# Patient Record
Sex: MALE | Race: WHITE | HISPANIC OR LATINO | Employment: FULL TIME | URBAN - METROPOLITAN AREA
[De-identification: names, ages, dates, MRNs, and addresses within clinical notes are randomized per-mention and may not be internally consistent; named-entity substitution may affect disease eponyms.]

---

## 2023-12-08 ENCOUNTER — ANCILLARY PROCEDURE (OUTPATIENT)
Dept: RADIOLOGY | Facility: CLINIC | Age: 23
End: 2023-12-08
Payer: COMMERCIAL

## 2023-12-08 ENCOUNTER — OFFICE VISIT (OUTPATIENT)
Dept: ORTHOPEDIC SURGERY | Facility: CLINIC | Age: 23
End: 2023-12-08
Payer: COMMERCIAL

## 2023-12-08 DIAGNOSIS — M25.512 ACUTE PAIN OF LEFT SHOULDER: ICD-10-CM

## 2023-12-08 DIAGNOSIS — M25.512 ACUTE PAIN OF LEFT SHOULDER: Primary | ICD-10-CM

## 2023-12-08 PROCEDURE — 99214 OFFICE O/P EST MOD 30 MIN: CPT | Performed by: STUDENT IN AN ORGANIZED HEALTH CARE EDUCATION/TRAINING PROGRAM

## 2023-12-08 PROCEDURE — 73030 X-RAY EXAM OF SHOULDER: CPT | Mod: LT

## 2023-12-08 PROCEDURE — 73030 X-RAY EXAM OF SHOULDER: CPT | Mod: LEFT SIDE | Performed by: RADIOLOGY

## 2023-12-08 RX ORDER — MELOXICAM 15 MG/1
15 TABLET ORAL DAILY
Qty: 14 TABLET | Refills: 0 | Status: SHIPPED | OUTPATIENT
Start: 2023-12-08 | End: 2023-12-22

## 2023-12-08 NOTE — PROGRESS NOTES
Thuan May is a 23 y.o. year-old  male  he is a presents with a chief complaint of Left shoulder pain. He is RHD and previously underwent a R shoulder arthroscopy and labral repair in 2022. Has not followed up since first post op visit but reports his right shoulder is doing very well without issues.    Today, he reports posterior shoulder pain after he slipped on ice and fell on his back last week. He also endorses some neck soreness around the muscles but no radicular symptoms or feelings of instability. The pain is worse with abduction and overhead motions. Has tried Advil at home which has not helped.     Right shoulder is doing fine postoperatively. He has not concerns or complaints.    Past Medical, Family, and Social History reviewed. He is a .     Review of Systems  A complete review of systems was conducted, pertinent only to the HPI noted above.    Physical Exam  GEN: Alert and Oriented x 3  Constitutional: Well appearing, in no apparent distress.  Eyes: sclera anicteric  ENT: hearing appropriate for normal conversation, neck appears symmetric with no gross thyromegaly  Pulm: No labored breathing, no wheezing  CVS: Regular rate and rhythm  PSY: normal mood and affect  Skin: No rashes, erythema, or induration around shoulder     Focused Musculoskeletal Exam:     Side: Left shoulder:  PROM:   FE (100) with end-range pain, has full pROM  ER 60 ABER/ABIR: 90/90  AROM: 90 with end-range pain, full ROM  FE (170)   ER 45 IR T8  Strength:  Supra [4/5] Infra [5/5] with pain Subscap [4/5]  Abd [5/5]    Special Tests  Shoulder  Does endorse some mild apprehension    ACJ:  AC TTP: [neg]  Cross Arm [neg]  AC prominence [no]    [Sensation intact Ax/median/ulnar/radial distributions  Motor intact Ax/median/radial/ulnar/AIN/PIN    X-rays of the shoulder independently viewed and interpreted: no acute fracture. Shoulder is reduced and with appropriate alignment.  There may be a subtle  Hill-Sachs    The patient history, physical examination and imaging studies are consistent with the diagnosis above.    After a thorough discussion with the patient including expectations, I would recommend we continue a conservative program for now.  Unclear on whether he had a instability event most likely he fell on his shoulder and has a contusion.  We discussed home/formal PT (deltoid isometrics, RTC strengthening, scapular stabilizers, stretching) and activity modification including avoidance of the positions and activities that provoke symptoms, including athletics.  We also discussed the ice and NSAIDS have prescribed short course of meloxicam. If they do not improve we'll get an MRI.    We will see them  back in 4-6 weeks for further follow up if not improved.

## 2024-11-11 ENCOUNTER — OFFICE VISIT (OUTPATIENT)
Dept: URGENT CARE | Age: 24
End: 2024-11-11
Payer: COMMERCIAL

## 2024-11-11 VITALS
OXYGEN SATURATION: 99 % | RESPIRATION RATE: 18 BRPM | TEMPERATURE: 97.6 F | HEART RATE: 86 BPM | DIASTOLIC BLOOD PRESSURE: 84 MMHG | WEIGHT: 225 LBS | BODY MASS INDEX: 33.33 KG/M2 | SYSTOLIC BLOOD PRESSURE: 138 MMHG | HEIGHT: 69 IN

## 2024-11-11 DIAGNOSIS — T15.01XA FOREIGN BODY OF RIGHT CORNEA, INITIAL ENCOUNTER: Primary | ICD-10-CM

## 2024-11-11 RX ORDER — ERYTHROMYCIN 5 MG/G
OINTMENT OPHTHALMIC EVERY 6 HOURS
Qty: 7 G | Refills: 0 | Status: SHIPPED | OUTPATIENT
Start: 2024-11-11 | End: 2024-11-11

## 2024-11-11 RX ORDER — ERYTHROMYCIN 5 MG/G
OINTMENT OPHTHALMIC EVERY 6 HOURS
Qty: 3.5 G | Refills: 0 | Status: SHIPPED | OUTPATIENT
Start: 2024-11-11 | End: 2024-11-18

## 2024-11-11 ASSESSMENT — VISUAL ACUITY: OU: 1

## 2024-11-12 NOTE — PATIENT INSTRUCTIONS
Go to the Emergency Department for severe eye pain, vision changes    If not improved within 2-3 days, call [phone] to schedule an appointment with ophthalmology, or visit [email or web address]    Place 0.5 inch ribbon of erythromycin to both eyes every 6 hours for 7 days

## 2024-11-12 NOTE — PROGRESS NOTES
"Subjective   Patient ID: Thuan May is a 24 y.o. male. They present today with a chief complaint of Eye Problem (Metal in both eyes ).    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 States he was wearing safety glasses but it was windy. Notes sensitivity to light, watering, redness of both eyes, R>L. Tried eye wash with some relief. No contact lens use.      Eye Problem      Past Medical History  Allergies as of 11/11/2024    (No Known Allergies)       (Not in a hospital admission)       No past medical history on file.    No past surgical history on file.         Review of Systems  Pertinent systems reviewed and were negative unless otherwise stated in HPI.    Objective    Vitals:    11/11/24 2006   BP: 138/84   Pulse: 86   Resp: 18   Temp: 36.4 °C (97.6 °F)   SpO2: 99%   Weight: 102 kg (225 lb)   Height: 1.753 m (5' 9\")     No LMP for male patient.    Physical Exam  Eyes:      General: Lids are normal. Vision grossly intact.         Right eye: Foreign body (single 1mm FB noted inferiorly) present.      Extraocular Movements: Extraocular movements intact.      Conjunctiva/sclera:      Right eye: Right conjunctiva is injected.      Left eye: Left conjunctiva is injected.      Comments: Watering of right eye. No significant focal uptake with fluorescein bilaterally. Negative Tiffanie sign. No abrasions noted.       Ocular Foreign Body Removal    Date/Time: 11/11/2024 8:26 PM    Performed by: Efra Romero PA-C  Authorized by: Efra Romero PA-C    Consent:     Consent obtained:  Verbal    Consent given by:  Patient    Risks discussed:  Corneal damage and incomplete removal  Location:     Location:  R corneal    Depth:  Superficial  Pre-procedure details:     Fluorescein exam: yes    Anesthesia:     Local anesthetic:  Tetracaine drops  Procedure details:     Localization method:  Fluorescein    " Removal mechanism:  Sterile cotton-tip applicator    Foreign bodies recovered:  1    Description:  Fine dust particle  Post-procedure details:     Procedure completion:  Tolerated well, no immediate complications     Diagnostic study results (if any) were reviewed by Efra Romero PA-C.    Assessment/Plan   Allergies, medications, history, and pertinent labs/EKGs/imaging reviewed by Efra Romero PA-C.     Medical Decision Making  Likely mechanical irritation of the eyes rather than chemical. Eye wash station in this clinic does not work well. Erythromycin ointment provided for infection prevention and lubrication. Advised ED evaluation for significantly worsening symptoms, ophtho if not improving. May return full duty. Low concern for abrasion, retained FB, globe rupture.    Orders and Diagnoses  Diagnoses and all orders for this visit:  Foreign body of right cornea, initial encounter  -     erythromycin (Romycin) 5 mg/gram (0.5 %) ophthalmic ointment; Apply to right eye every 6 hours for 7 days. Apply Amount per Dose: 0.5 inch (~1 cm) per dose.  -     erythromycin (Romycin) 5 mg/gram (0.5 %) ophthalmic ointment; Apply to left eye every 6 hours for 7 days. Apply Amount per Dose: 0.5 inch (~1 cm) per dose.  Other orders  -     Ocular Foreign Body Removal      Medical Admin Record      Disposition: Home    Electronically signed by Efra Romero PA-C